# Patient Record
Sex: FEMALE | Race: WHITE
[De-identification: names, ages, dates, MRNs, and addresses within clinical notes are randomized per-mention and may not be internally consistent; named-entity substitution may affect disease eponyms.]

---

## 2018-08-21 ENCOUNTER — HOSPITAL ENCOUNTER (OUTPATIENT)
Dept: HOSPITAL 89 - RAD | Age: 68
End: 2018-08-21
Attending: OBSTETRICS & GYNECOLOGY
Payer: COMMERCIAL

## 2018-08-21 DIAGNOSIS — M85.88: ICD-10-CM

## 2018-08-21 DIAGNOSIS — Z13.820: Primary | ICD-10-CM

## 2018-08-21 PROCEDURE — 77080 DXA BONE DENSITY AXIAL: CPT

## 2018-08-21 NOTE — RADIOLOGY IMAGING REPORT
FACILITY: [facility] 

 

PATIENT NAME: Malachi Smith

: 1950

MR: [id number]

V: [id number]

EXAM DATE: 

ORDERING PHYSICIAN: PIPE DILLON

TECHNOLOGIST: 

 

[place] [facility] [place]

Patient: Malachi Smith

: 1950

MRN: [id number]

Visit/Account:[id number]

Date of Sevice:  2018

 

ACCESSION #: [id number]

 

DEXA Scan

 

Clinical history:  Postmenopausal.

 

Comparison:  DEXA scan from 2015.

 

LUMBAR SPINE:

The bone mineral density (BMD) measured from L2-L4 correlates with a Z-score of 2.3 and a T-score of 
1 which is Normal as defined by the World Health Organization.  The corresponding risk of fracture in
 the lumbar spine is Not increased compared with a young adult reference population.  This value has 
increased by 8.1 % since the prior study.  More than 5% change is considered significant.

 

HIP:

Bone mineral density (BMD) measured in the LEFT total hip region correlates with a Z-score -0.4 and a
 T-score of -1.4 which is osteopenia as defined by the World Health Organization.  The corresponding 
risk of fracture in the hip is 2-3 times increased compared to a young adult reference population. Th
is value has increased by 0.4 % since the prior study.  More than 5% change is considered significant
.  T score left femoral neck -2.1

 

Bone mineral density (BMD) measured in the Femoral Neck region measures 0.744 g/cm?.

 

IMPRESSION:

1.  Lumbar spine: Normal.  There has been 8.1% increase in the bone mineral density since the previou
s exam.

2.  Left Total Hip:  Osteopenia. There has been 0.4% increase in the bone mineral density since the p
revious exam.

3. Femoral Neck: Bone Mineral Density is 0.744 g/cm?

 

The next DEXA scan of this patient should include the following sites: L2-L4 and the left hip.

 

FRAX? WHO Fracture Risk Assessment Tool link:

<http://www.shef.ac.uk/FRAX/tool.jsp?locationValue=9>

 

 

PLEASE NOTE:

1)  The World Health Organization defines low BMD as follows:

                                                                      T-score

                   Normal                                  > -1

                   Osteopenia                           < -1 and  > -2.5

                   Osteoporosis                         < -2.5 without fractures

                   Established osteoporosis       < -2.5 with fractures

2)  In general, you may wish to consider:

                    Diagnosis                  Treatment                       Follow-up DEXA

 

                    Normal BMD            Prevention                      2-3 years

                    Osteopenia                Prevention/therapy         1-2 years

                    Osteoporosis             Therapy                           Yearly

3)  Fracture risk estimated from the T-score is more accurate for vertebral fractures (often spontane
ous) than for hip fractures.

 

Report Dictated By: Aliza Dumont MD at 2018 11:05 AM

 

Report E-Signed By: Aliza Dumont MD  at 2018 11:07 AM

 

WSN:[email or web address]

## 2018-11-08 ENCOUNTER — HOSPITAL ENCOUNTER (OUTPATIENT)
Dept: HOSPITAL 89 - MAMO | Age: 68
End: 2018-11-08
Attending: FAMILY MEDICINE
Payer: COMMERCIAL

## 2018-11-08 DIAGNOSIS — Z12.31: Primary | ICD-10-CM

## 2018-11-08 PROCEDURE — 77067 SCR MAMMO BI INCL CAD: CPT

## 2018-11-08 PROCEDURE — 77063 BREAST TOMOSYNTHESIS BI: CPT

## 2018-11-08 NOTE — RADIOLOGY IMAGING REPORT
FACILITY: [facility] - [place] 

 

PATIENT NAME: ASUNCION SEGOVIA

: [id number]

MR: [id number]

V: [id number]

EXAM DATE: [id number]

ORDERING PHYSICIAN: CHRISTIAN SIEGEL

TECHNOLOGIST: Tegan Ortiz

 

PROCEDURE:BILATERAL DIGITAL SCREENING MAMMOGRAM WITH CAD ASSISTED 

INTERPRETATION & 3D TOMOSYNTHESIS 

 

COMPARISON:Prior mammograms from 10/31/17 back to 12.

 

INDICATIONS:SCREENING

Left breast cancer 2008 treated with lumpectomy and radiotherapy. 

 

FINDINGS:  

The breast parenchyma consists of fibroglandular tissue. 

There is postsurgical architectural distortion in the retroareolar 

region of the Left breast which is stable and unchanged as far back as 

. There are no developing masses or worrisome microcalcification in 

either breast.

  

DIAGNOSTIC CATEGORY 2--BENIGN FINDING.  

 

RECOMMENDATIONS:

ROUTINE MAMMOGRAM AND CLINICAL EVALUATION.   

 

IMPRESSION: 

BIRADS 2: Benign finding.

Stable exam.

 

 

 

 

 

 

 

 

Dictated by:  Eran Lovett M.D. on 2018 at 9:43   

Transcribed by: FIX on 2018 at 11:31    

Approved by:  Eran Lovett M.D. on 2018 at 16:20   

[facility]

## 2021-04-08 ENCOUNTER — APPOINTMENT (RX ONLY)
Dept: URBAN - NONMETROPOLITAN AREA CLINIC 29 | Facility: CLINIC | Age: 71
Setting detail: DERMATOLOGY
End: 2021-04-08

## 2021-04-08 DIAGNOSIS — L82.1 OTHER SEBORRHEIC KERATOSIS: ICD-10-CM

## 2021-04-08 DIAGNOSIS — L11.1 TRANSIENT ACANTHOLYTIC DERMATOSIS [GROVER]: ICD-10-CM

## 2021-04-08 DIAGNOSIS — L91.8 OTHER HYPERTROPHIC DISORDERS OF THE SKIN: ICD-10-CM

## 2021-04-08 PROCEDURE — 99213 OFFICE O/P EST LOW 20 MIN: CPT

## 2021-04-08 PROCEDURE — ? ADDITIONAL NOTES

## 2021-04-08 PROCEDURE — ? COUNSELING

## 2021-04-08 PROCEDURE — ? PRESCRIPTION MEDICATION MANAGEMENT

## 2021-04-08 NOTE — PROCEDURE: PRESCRIPTION MEDICATION MANAGEMENT
Initiate Treatment: TAC 0.1% cream for bid application 80 gm r x 2
Render In Strict Bullet Format?: No
Detail Level: Zone

## 2021-04-22 ENCOUNTER — APPOINTMENT (RX ONLY)
Dept: URBAN - NONMETROPOLITAN AREA CLINIC 29 | Facility: CLINIC | Age: 71
Setting detail: DERMATOLOGY
End: 2021-04-22

## 2021-04-22 DIAGNOSIS — L82.1 OTHER SEBORRHEIC KERATOSIS: ICD-10-CM

## 2021-04-22 PROCEDURE — ? BENIGN DESTRUCTION COSMETIC

## 2021-04-22 PROCEDURE — ? BENIGN DESTRUCTION COSMETIC MULTI

## 2021-04-22 ASSESSMENT — LOCATION ZONE DERM: LOCATION ZONE: FACE

## 2021-04-22 ASSESSMENT — LOCATION SIMPLE DESCRIPTION DERM
LOCATION SIMPLE: RIGHT EYEBROW
LOCATION SIMPLE: RIGHT FOREHEAD
LOCATION SIMPLE: LEFT FOREHEAD
LOCATION SIMPLE: LEFT TEMPLE

## 2021-04-22 ASSESSMENT — LOCATION DETAILED DESCRIPTION DERM
LOCATION DETAILED: RIGHT INFERIOR LATERAL FOREHEAD
LOCATION DETAILED: LEFT INFERIOR LATERAL FOREHEAD
LOCATION DETAILED: RIGHT LATERAL EYEBROW
LOCATION DETAILED: LEFT MID TEMPLE

## 2021-04-22 NOTE — PROCEDURE: BENIGN DESTRUCTION COSMETIC MULTI
Post-Care Instructions: I reviewed with the patient in detail post-care instructions. Patient is to wear sunprotection, and avoid picking at any of the treated lesions. Pt may apply Vaseline to crusted or scabbing areas.
Anesthesia Volume In Cc: 0.5
Anesthesia Type: 2% Xylocaine with 1:100,000 epinephrine
Total Number Of Lesions Treated: 2
Detail Level: Detailed
Consent: The patient's consent was obtained including but not limited to risks of crusting, scabbing, blistering, scarring, darker or lighter pigmentary change, recurrence, incomplete removal and infection.